# Patient Record
Sex: FEMALE | Race: WHITE | NOT HISPANIC OR LATINO
[De-identification: names, ages, dates, MRNs, and addresses within clinical notes are randomized per-mention and may not be internally consistent; named-entity substitution may affect disease eponyms.]

---

## 2017-06-29 ENCOUNTER — RECORDS - HEALTHEAST (OUTPATIENT)
Dept: ADMINISTRATIVE | Facility: OTHER | Age: 22
End: 2017-06-29

## 2017-07-03 ENCOUNTER — OFFICE VISIT - RIVER FALLS (OUTPATIENT)
Dept: FAMILY MEDICINE | Facility: CLINIC | Age: 22
End: 2017-07-03

## 2017-07-05 ENCOUNTER — OFFICE VISIT - RIVER FALLS (OUTPATIENT)
Dept: FAMILY MEDICINE | Facility: CLINIC | Age: 22
End: 2017-07-05

## 2017-07-05 ASSESSMENT — MIFFLIN-ST. JEOR: SCORE: 1857.19

## 2017-07-07 ENCOUNTER — OFFICE VISIT - RIVER FALLS (OUTPATIENT)
Dept: FAMILY MEDICINE | Facility: CLINIC | Age: 22
End: 2017-07-07

## 2017-08-09 ENCOUNTER — OFFICE VISIT - RIVER FALLS (OUTPATIENT)
Dept: FAMILY MEDICINE | Facility: CLINIC | Age: 22
End: 2017-08-09

## 2018-05-03 ENCOUNTER — RECORDS - HEALTHEAST (OUTPATIENT)
Dept: ADMINISTRATIVE | Facility: OTHER | Age: 23
End: 2018-05-03

## 2018-05-03 LAB — PAP SMEAR - HIM PATIENT REPORTED: NORMAL

## 2018-06-04 ENCOUNTER — RECORDS - HEALTHEAST (OUTPATIENT)
Dept: ADMINISTRATIVE | Facility: OTHER | Age: 23
End: 2018-06-04

## 2018-11-02 ENCOUNTER — RECORDS - HEALTHEAST (OUTPATIENT)
Dept: ADMINISTRATIVE | Facility: OTHER | Age: 23
End: 2018-11-02

## 2019-07-31 ENCOUNTER — OFFICE VISIT - HEALTHEAST (OUTPATIENT)
Dept: FAMILY MEDICINE | Facility: CLINIC | Age: 24
End: 2019-07-31

## 2019-07-31 DIAGNOSIS — Z76.89 ESTABLISHING CARE WITH NEW DOCTOR, ENCOUNTER FOR: ICD-10-CM

## 2019-07-31 DIAGNOSIS — F41.9 ANXIETY AND DEPRESSION: ICD-10-CM

## 2019-07-31 DIAGNOSIS — Z00.00 ANNUAL PHYSICAL EXAM: ICD-10-CM

## 2019-07-31 DIAGNOSIS — E66.09 CLASS 1 OBESITY DUE TO EXCESS CALORIES WITHOUT SERIOUS COMORBIDITY WITH BODY MASS INDEX (BMI) OF 33.0 TO 33.9 IN ADULT: ICD-10-CM

## 2019-07-31 DIAGNOSIS — R46.89 CONCERN ABOUT APPEARANCE OF BREAST: ICD-10-CM

## 2019-07-31 DIAGNOSIS — E66.811 CLASS 1 OBESITY DUE TO EXCESS CALORIES WITHOUT SERIOUS COMORBIDITY WITH BODY MASS INDEX (BMI) OF 33.0 TO 33.9 IN ADULT: ICD-10-CM

## 2019-07-31 DIAGNOSIS — F32.A ANXIETY AND DEPRESSION: ICD-10-CM

## 2019-07-31 LAB
ALBUMIN SERPL-MCNC: 4.1 G/DL (ref 3.5–5)
ALP SERPL-CCNC: 62 U/L (ref 45–120)
ALT SERPL W P-5'-P-CCNC: 33 U/L (ref 0–45)
ANION GAP SERPL CALCULATED.3IONS-SCNC: 8 MMOL/L (ref 5–18)
AST SERPL W P-5'-P-CCNC: 25 U/L (ref 0–40)
BILIRUB SERPL-MCNC: 0.2 MG/DL (ref 0–1)
BUN SERPL-MCNC: 13 MG/DL (ref 8–22)
CALCIUM SERPL-MCNC: 9.9 MG/DL (ref 8.5–10.5)
CHLORIDE BLD-SCNC: 105 MMOL/L (ref 98–107)
CHOLEST SERPL-MCNC: 181 MG/DL
CO2 SERPL-SCNC: 27 MMOL/L (ref 22–31)
CREAT SERPL-MCNC: 0.77 MG/DL (ref 0.6–1.1)
FASTING STATUS PATIENT QL REPORTED: NO
GFR SERPL CREATININE-BSD FRML MDRD: >60 ML/MIN/1.73M2
GLUCOSE BLD-MCNC: 107 MG/DL (ref 70–125)
HBA1C MFR BLD: 4.9 % (ref 3.5–6)
HDLC SERPL-MCNC: 32 MG/DL
LDLC SERPL CALC-MCNC: 111 MG/DL
POTASSIUM BLD-SCNC: 3.6 MMOL/L (ref 3.5–5)
PROT SERPL-MCNC: 7.3 G/DL (ref 6–8)
SODIUM SERPL-SCNC: 140 MMOL/L (ref 136–145)
TRIGL SERPL-MCNC: 189 MG/DL
TSH SERPL DL<=0.005 MIU/L-ACNC: 1.39 UIU/ML (ref 0.3–5)

## 2019-07-31 RX ORDER — CITALOPRAM HYDROBROMIDE 20 MG/1
20 TABLET ORAL DAILY
Qty: 30 TABLET | Refills: 2 | Status: SHIPPED | OUTPATIENT
Start: 2019-07-31

## 2019-07-31 ASSESSMENT — MIFFLIN-ST. JEOR: SCORE: 1948.13

## 2019-08-01 ENCOUNTER — COMMUNICATION - HEALTHEAST (OUTPATIENT)
Dept: FAMILY MEDICINE | Facility: CLINIC | Age: 24
End: 2019-08-01

## 2020-06-18 ENCOUNTER — RECORDS - HEALTHEAST (OUTPATIENT)
Dept: HEALTH INFORMATION MANAGEMENT | Facility: CLINIC | Age: 25
End: 2020-06-18

## 2021-05-31 NOTE — PROGRESS NOTES
Please call patient and inform:  All labs were normal including thyroid, cholesterol, diabetes screening negative.

## 2021-05-31 NOTE — TELEPHONE ENCOUNTER
----- Message from Dominique Avila DO sent at 8/1/2019  8:54 AM CDT -----  Please call patient and inform:  All labs were normal including thyroid, cholesterol, diabetes screening negative.

## 2021-06-03 VITALS — HEIGHT: 72 IN | BODY MASS INDEX: 32.82 KG/M2 | WEIGHT: 242.3 LBS

## 2021-06-16 PROBLEM — R46.89 CONCERN ABOUT APPEARANCE OF BREAST: Status: ACTIVE | Noted: 2019-08-11

## 2021-06-16 PROBLEM — E66.811 CLASS 1 OBESITY DUE TO EXCESS CALORIES WITHOUT SERIOUS COMORBIDITY WITH BODY MASS INDEX (BMI) OF 33.0 TO 33.9 IN ADULT: Status: ACTIVE | Noted: 2019-08-11

## 2021-06-16 PROBLEM — E66.09 CLASS 1 OBESITY DUE TO EXCESS CALORIES WITHOUT SERIOUS COMORBIDITY WITH BODY MASS INDEX (BMI) OF 33.0 TO 33.9 IN ADULT: Status: ACTIVE | Noted: 2019-08-11

## 2021-06-16 PROBLEM — F41.9 ANXIETY AND DEPRESSION: Status: ACTIVE | Noted: 2019-08-11

## 2021-06-16 PROBLEM — F32.A ANXIETY AND DEPRESSION: Status: ACTIVE | Noted: 2019-08-11

## 2021-06-19 NOTE — LETTER
Letter by Dominique Avila DO at      Author: Dominique Avila DO Service: -- Author Type: --    Filed:  Encounter Date: 8/1/2019 Status: (Other)         Yennifer Berkowitz  [address] [place] [facility] [address]             August 1, 2019         Dear MsLee Ann Bolanosy,    Below are the results from your recent visit:    Resulted Orders   Glycosylated Hemoglobin A1c   Result Value Ref Range    Hemoglobin A1c 4.9 3.5 - 6.0 %   Lipid Cascade   Result Value Ref Range    Cholesterol 181 <=199 mg/dL    Triglycerides 189 (H) <=149 mg/dL    HDL Cholesterol 32 (L) >=50 mg/dL    LDL Calculated 111 <=129 mg/dL    Patient Fasting > 8hrs? No    Thyroid Stimulating Hormone (TSH)   Result Value Ref Range    TSH 1.39 0.30 - 5.00 uIU/mL   Comprehensive Metabolic Panel   Result Value Ref Range    Sodium 140 136 - 145 mmol/L    Potassium 3.6 3.5 - 5.0 mmol/L    Chloride 105 98 - 107 mmol/L    CO2 27 22 - 31 mmol/L    Anion Gap, Calculation 8 5 - 18 mmol/L    Glucose 107 70 - 125 mg/dL    BUN 13 8 - 22 mg/dL    Creatinine 0.77 0.60 - 1.10 mg/dL    GFR MDRD Af Amer >60 >60 mL/min/1.73m2    GFR MDRD Non Af Amer >60 >60 mL/min/1.73m2    Bilirubin, Total 0.2 0.0 - 1.0 mg/dL    Calcium 9.9 8.5 - 10.5 mg/dL    Protein, Total 7.3 6.0 - 8.0 g/dL    Albumin 4.1 3.5 - 5.0 g/dL    Alkaline Phosphatase 62 45 - 120 U/L    AST 25 0 - 40 U/L    ALT 33 0 - 45 U/L    Narrative    Fasting Glucose reference range is 70-99 mg/dL per  American Diabetes Association (ADA) guidelines.       Normal labs    Please call with questions or contact us using txtrt.    Sincerely,        Electronically signed by Dominique Avila DO

## 2021-06-27 NOTE — PROGRESS NOTES
Progress Notes by Dominique Avila DO at 7/31/2019  2:40 PM     Author: Dominique Avila DO Service: -- Author Type: Physician    Filed: 8/11/2019  9:52 PM Encounter Date: 7/31/2019 Status: Signed    : Dominique Avila DO (Physician)       FEMALE PREVENTATIVE EXAM    Assessment and Plan:       Yennifer was seen today for annual exam.    Establishing care with new doctor, encounter for: hx, all and meds reviewed and updated in chart. WIll try to obtain records from [facility].     Annual physical exam:   Self breast exam risks/benefits reviewed  Safe sex practices reviewed with patient  Contraception reviewed and vasectomy  HPV testing dicussed and new Pap smear recommendations reviewed with patient    Class 1 obesity due to excess calories without serious comorbidity with body mass index (BMI) of 33.0 to 33.9 in adult: check screening labs.   -     Glycosylated Hemoglobin A1c  -     Lipid Cascade  -     Thyroid Stimulating Hormone (TSH)  -     Comprehensive Metabolic Panel    Concern about appearance of breast: pt ready to have surgery. Would like referral.   -     Ambulatory referral to General Surgery  -     Ambulatory referral to Plastic Surgery    Anxiety and depression: contracted for safety. Start celexa. F/u in 4-6 weeks. Referral for therapy.   -     citalopram (CELEXA) 20 MG tablet; Take 1 tablet (20 mg total) by mouth daily.  -     Ambulatory referral to Psychology        Next follow up:  No follow-ups on file.    Immunization Review  Adult Imm Review: Unknown status, attempting to get records    I discussed the following with the patient:   Adult Healthy Living: Importance of regular exercise  Healthy nutrition  Weight loss referral options  Stress management      Subjective:   Chief Complaint: Yennifer Berkowitz is an 24 y.o. female here for a preventative health visit.     HPI:    Had IUD and had bad cramps with it. Has also tried OCPs in the past and didn't like them. Does not want to be on any birth control at this  time- boyfriend got a vasectomy.   Last Pap was recently at  [facility] when had IUD removed.     Concerns today: uneven breasts.  Wants to do something about it.  Would like to finally have surgery.  Had been seen in the past and the people that work there made her feel really bad about it.  She puts a little something on one side of the bathing suit to help it be equal. No pain, no discharge or skin changes.     Also noted high PHQ-9. Patient states she wanted to address this today as well. Both her and BF have depression/anxiety. She feels its been worsening and would like to do something about it. Would like to start a medication today and see a therapist. No SI.     Little interest or pleasure in doing things: Nearly every day  Feeling down, depressed, or hopeless: Nearly every day  Trouble falling or staying asleep, or sleeping too much: Nearly every day  Feeling tired or having little energy: Nearly every day  Poor appetite or overeating: Nearly every day  Feeling bad about yourself - or that you are a failure or have let yourself or your family down: More than half the days  Trouble concentrating on things, such as reading the newspaper or watching television: Nearly every day  Moving or speaking so slowly that other people could have noticed. Or the opposite - being so fidgety or restless that you have been moving around a lot more than usual: Nearly every day  Thoughts that you would be better off dead, or of hurting yourself in some way: Not at all  PHQ-9 Total Score: 23  If you checked off any problems, how difficult have these problems made it for you to do your work, take care of things at home, or get along with other people?: Extremely dIfficult      Healthy Habits  Are you taking a daily aspirin? No  Do you typically exercising at least 40 min, 3-4 times per week?  NO  Do you usually eat at least 4 servings of fruit and vegetables a day, include whole grains and fiber and avoid regularly eating high fat  "foods? NO  Have you had an eye exam in the past two years? NO  Do you see a dentist twice per year? NO  Do you have any concerns regarding sleep? YES    Safety Screen  If you own firearms, are they secured in a locked gun cabinet or with trigger locks? NO  Do you feel you are safe where you are living?: Yes (7/31/2019  2:45 PM)  Do you feel you are safe in your relationship(s)?: Yes (7/31/2019  2:45 PM)      Review of Systems:  Please see above.  The rest of the review of systems are negative for all systems.     Pap History:   need to obtain recrods  Cancer Screening       Status Date      PAP SMEAR Overdue 3/17/2017           Patient Care Team:  Provider, No Primary Care as PCP - General        History     Reviewed By Date/Time Sections Reviewed    Sirena Yepez CMA 7/31/2019  2:53 PM Tobacco, Alcohol, Drug Use, Sexual Activity            Objective:   Vital Signs:   Visit Vitals  /74 (Patient Site: Left Arm, Patient Position: Sitting, Cuff Size: Adult Regular)   Pulse 72   Temp 98.6  F (37  C) (Other)   Resp 16   Ht 5' 11.5\" (1.816 m)   Wt (!) 242 lb 4.8 oz (109.9 kg)   LMP 07/29/2019   SpO2 98% Comment: ra   Breastfeeding? No   BMI 33.32 kg/m           PHYSICAL EXAM  PHYSICAL EXAM  Vitals:    07/31/19 1449   BP: 116/74   Pulse: 72   Resp: 16   Temp: 98.6  F (37  C)   SpO2: 98%     GENERAL APPEARANCE:  The patient is a pleasant, normal appearing female with normal affect and in no distress.  HEENT: Normocephalic atraumatic.  PERRL, ocular muscles intact.  No conjunctivitis or icterus noticed.  TMs clear with good cone of light reflex.  Oropharynx clear and moist mucous membranes.  NECK: supple.  No cervical lymphadenopathy.  No thyromegaly, no nodules palpated, trachea midline.  LUNGS: Clear bilaterally with normal respiratory effort  HEART:   Regular rate and rhythm.  No murmurs noted.  Pulses are full and symmetrical.  BREASTS: Breast exam performed seated and supine.  No masses, non-tender, no nipple " discharge or lymphadenopathy. + Left breast is at least one size smaller than the right. Noticeable difference.  ABDOMEN: Soft, non-tender, non-distended.  No hepatosplenomegaly.  Normal bowel sounds.  No umbilical or inguinal hernias.  SKIN:  Warm and dry to touch.  No lesions or rashes noted.    PSYCHIATRIC/NEUROLOGIC:  Appropriate mood and affect, normal recall, alert and oriented x 3  EXTREMITIES:  Warm and well perfused.  No edema noted.  Muscle strength and sensation are normal bilaterally 5/5 in both upper and lower extremities.   :  N/A just had pap this year.               Medication List           Accurate as of 7/31/19 11:59 PM. If you have any questions, ask your nurse or doctor.               START taking these medications    citalopram 20 MG tablet  Also known as:  celeXA  INSTRUCTIONS:  Take 1 tablet (20 mg total) by mouth daily.  Started by:  Dominique Avila, DO              Where to Get Your Medications      These medications were sent to [id number] - [place], [place] - [address] AT [facility] [address]  [address] [address]    Phone:  [phone]     citalopram 20 MG tablet         Additional Screenings Completed Today:

## 2022-02-12 VITALS
WEIGHT: 226.4 LBS | TEMPERATURE: 98.6 F | BODY MASS INDEX: 31.58 KG/M2 | TEMPERATURE: 97.2 F | WEIGHT: 224 LBS | HEART RATE: 84 BPM | HEIGHT: 71 IN | BODY MASS INDEX: 31.36 KG/M2 | HEART RATE: 80 BPM
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
## 2022-02-16 NOTE — PROGRESS NOTES
Patient:   BARAK RAIN            MRN: [id number]            FIN: [id number]               Age:   22 years     Sex:  Female     :  1995   Associated Diagnoses:   Visit for suture removal   Author:   Martell Cho PA-C      Chief Complaint   Visit for suture removal. Feels like the leg looks better. Finishing Keflex.  Rest of sutures were removed on Wed. Please see last two notes. No drainage. No fever or chills. CC above noted and confirmed with the patient.      Procedure   Staple/ Suture removal procedure   Date/ Time:  2017 3:09:00 PM.     Confirmed: patient, procedure, site, safety procedures followed.     Performed by: Martell Cho PA-C.     Wound assessment:: the left lower leg, characteristics (clean, sutured), incisional margin irregular, course progressing as expected.     Insertion/ Removal: One running stitch removed. Steri strips placed. Wound much less erythematous and swollen then when I saw it earlier in the week. No drainage. Not warm to touch..     Dressing applied: adhesive strip.     Procedure tolerated: well.     Complications: none.        Impression and Plan   Diagnosis     Visit for suture removal (HWL06-NF Z48.02).     Counseled:  Patient, Regarding treatment.    Finish Keflex. Local cares. FU PRN.
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
